# Patient Record
Sex: MALE | Race: WHITE | NOT HISPANIC OR LATINO
[De-identification: names, ages, dates, MRNs, and addresses within clinical notes are randomized per-mention and may not be internally consistent; named-entity substitution may affect disease eponyms.]

---

## 2017-01-04 ENCOUNTER — APPOINTMENT (OUTPATIENT)
Dept: INTERNAL MEDICINE | Facility: CLINIC | Age: 47
End: 2017-01-04

## 2017-01-04 VITALS
HEART RATE: 86 BPM | RESPIRATION RATE: 12 BRPM | DIASTOLIC BLOOD PRESSURE: 60 MMHG | SYSTOLIC BLOOD PRESSURE: 94 MMHG | OXYGEN SATURATION: 99 % | BODY MASS INDEX: 20.32 KG/M2 | HEIGHT: 72 IN | WEIGHT: 150 LBS

## 2017-01-09 LAB
ALBUMIN SERPL ELPH-MCNC: 4.5 G/DL
ALP BLD-CCNC: 85 U/L
ALT SERPL-CCNC: 39 U/L
ANION GAP SERPL CALC-SCNC: 16 MMOL/L
AST SERPL-CCNC: 37 U/L
BASOPHILS # BLD AUTO: 0.04 K/UL
BASOPHILS NFR BLD AUTO: 0.5 %
BILIRUB SERPL-MCNC: 0.6 MG/DL
BUN SERPL-MCNC: 17 MG/DL
CALCIUM SERPL-MCNC: 10.2 MG/DL
CHLORIDE SERPL-SCNC: 104 MMOL/L
CHOLEST SERPL-MCNC: 159 MG/DL
CHOLEST/HDLC SERPL: 2.6 RATIO
CO2 SERPL-SCNC: 23 MMOL/L
CREAT SERPL-MCNC: 0.72 MG/DL
EOSINOPHIL # BLD AUTO: 0.14 K/UL
EOSINOPHIL NFR BLD AUTO: 1.7 %
GLUCOSE SERPL-MCNC: 89 MG/DL
HCT VFR BLD CALC: 45.7 %
HDLC SERPL-MCNC: 62 MG/DL
HGB BLD-MCNC: 15.6 G/DL
IMM GRANULOCYTES NFR BLD AUTO: 0.4 %
LDLC SERPL CALC-MCNC: 77 MG/DL
LYMPHOCYTES # BLD AUTO: 1.96 K/UL
LYMPHOCYTES NFR BLD AUTO: 23.8 %
MAN DIFF?: NORMAL
MCHC RBC-ENTMCNC: 32.2 PG
MCHC RBC-ENTMCNC: 34.1 GM/DL
MCV RBC AUTO: 94.4 FL
MONOCYTES # BLD AUTO: 0.61 K/UL
MONOCYTES NFR BLD AUTO: 7.4 %
NEUTROPHILS # BLD AUTO: 5.45 K/UL
NEUTROPHILS NFR BLD AUTO: 66.2 %
PLATELET # BLD AUTO: 281 K/UL
POTASSIUM SERPL-SCNC: 4.8 MMOL/L
PROT SERPL-MCNC: 6.9 G/DL
RBC # BLD: 4.84 M/UL
RBC # FLD: 12.9 %
SODIUM SERPL-SCNC: 143 MMOL/L
TRIGL SERPL-MCNC: 99 MG/DL
WBC # FLD AUTO: 8.23 K/UL

## 2017-09-27 ENCOUNTER — APPOINTMENT (OUTPATIENT)
Dept: INTERNAL MEDICINE | Facility: CLINIC | Age: 47
End: 2017-09-27
Payer: COMMERCIAL

## 2017-09-27 VITALS
RESPIRATION RATE: 12 BRPM | BODY MASS INDEX: 19.77 KG/M2 | HEART RATE: 63 BPM | OXYGEN SATURATION: 99 % | DIASTOLIC BLOOD PRESSURE: 59 MMHG | SYSTOLIC BLOOD PRESSURE: 92 MMHG | WEIGHT: 146 LBS | HEIGHT: 72 IN

## 2017-09-27 DIAGNOSIS — R19.5 OTHER FECAL ABNORMALITIES: ICD-10-CM

## 2017-09-27 DIAGNOSIS — M54.5 LOW BACK PAIN: ICD-10-CM

## 2017-09-27 LAB
CARD LOT #: 3341
CARD LOT EXP DATE: NORMAL
DATE COLLECTED: NORMAL
DEVELOPER LOT #: NORMAL
DEVELOPER LOT EXP DATE: NORMAL
HEMOCCULT SP1 STL QL: NEGATIVE
QUALITY CONTROL: NO

## 2017-09-27 PROCEDURE — 82270 OCCULT BLOOD FECES: CPT

## 2017-09-27 PROCEDURE — 99396 PREV VISIT EST AGE 40-64: CPT | Mod: 25

## 2017-10-07 PROBLEM — Z00.00 ENCOUNTER FOR PREVENTIVE HEALTH EXAMINATION: Noted: 2017-10-07

## 2017-10-12 ENCOUNTER — OUTPATIENT (OUTPATIENT)
Dept: OUTPATIENT SERVICES | Facility: HOSPITAL | Age: 47
LOS: 1 days | End: 2017-10-12
Payer: COMMERCIAL

## 2017-10-12 ENCOUNTER — APPOINTMENT (OUTPATIENT)
Dept: MRI IMAGING | Facility: CLINIC | Age: 47
End: 2017-10-12

## 2017-10-12 DIAGNOSIS — Z00.8 ENCOUNTER FOR OTHER GENERAL EXAMINATION: ICD-10-CM

## 2017-10-12 PROCEDURE — 72148 MRI LUMBAR SPINE W/O DYE: CPT

## 2017-10-12 PROCEDURE — 72148 MRI LUMBAR SPINE W/O DYE: CPT | Mod: 26

## 2018-09-28 ENCOUNTER — APPOINTMENT (OUTPATIENT)
Dept: INTERNAL MEDICINE | Facility: CLINIC | Age: 48
End: 2018-09-28
Payer: COMMERCIAL

## 2018-09-28 VITALS
RESPIRATION RATE: 12 BRPM | OXYGEN SATURATION: 97 % | WEIGHT: 148.9 LBS | TEMPERATURE: 97.5 F | BODY MASS INDEX: 20.17 KG/M2 | HEIGHT: 72 IN | HEART RATE: 65 BPM | SYSTOLIC BLOOD PRESSURE: 102 MMHG | DIASTOLIC BLOOD PRESSURE: 69 MMHG

## 2018-09-28 PROCEDURE — 82270 OCCULT BLOOD FECES: CPT

## 2018-09-28 PROCEDURE — 99396 PREV VISIT EST AGE 40-64: CPT | Mod: 25

## 2018-09-28 RX ORDER — CYCLOBENZAPRINE HYDROCHLORIDE 10 MG/1
10 TABLET, FILM COATED ORAL 3 TIMES DAILY
Qty: 30 | Refills: 3 | Status: DISCONTINUED | COMMUNITY
Start: 2017-01-04 | End: 2018-09-28

## 2018-09-28 RX ORDER — MELOXICAM 7.5 MG/1
7.5 TABLET ORAL
Qty: 60 | Refills: 5 | Status: DISCONTINUED | COMMUNITY
Start: 2017-01-04 | End: 2018-09-28

## 2018-09-28 NOTE — HEALTH RISK ASSESSMENT
[Excellent] : ~his/her~ current health as excellent [Good] : ~his/her~  mood as  good [No falls in past year] : Patient reported no falls in the past year [0] : 2) Feeling down, depressed, or hopeless: Not at all (0) [FreeTextEntry1] : atypical moles on back. [] : No [de-identified] : socially [Reports changes in dental health] : Reports no changes in dental health [ColonoscopyComments] : Needs appointment [de-identified] : high pitch limited, left ear. [de-identified] : reading glasses

## 2018-09-28 NOTE — PHYSICAL EXAM

## 2018-09-28 NOTE — PLAN
[FreeTextEntry1] : Add carbs, as he is on a paleo diet.\par check lipid profile, psa, cmp.\par declined influenza vaccation.\par Referral for screening colonoscopy.

## 2018-10-03 LAB
ALBUMIN SERPL ELPH-MCNC: 4.6 G/DL
ALP BLD-CCNC: 93 U/L
ALT SERPL-CCNC: 25 U/L
ANION GAP SERPL CALC-SCNC: 20 MMOL/L
AST SERPL-CCNC: 31 U/L
BILIRUB SERPL-MCNC: 1.3 MG/DL
BUN SERPL-MCNC: 15 MG/DL
CALCIUM SERPL-MCNC: 10.1 MG/DL
CHLORIDE SERPL-SCNC: 98 MMOL/L
CHOLEST SERPL-MCNC: 132 MG/DL
CHOLEST/HDLC SERPL: 2.1 RATIO
CO2 SERPL-SCNC: 21 MMOL/L
CREAT SERPL-MCNC: 0.68 MG/DL
GLUCOSE SERPL-MCNC: 69 MG/DL
HDLC SERPL-MCNC: 63 MG/DL
LDLC SERPL CALC-MCNC: 61 MG/DL
POTASSIUM SERPL-SCNC: 4.2 MMOL/L
PROT SERPL-MCNC: 6.9 G/DL
PSA FREE FLD-MCNC: 52.1
PSA FREE SERPL-MCNC: 0.25 NG/ML
PSA SERPL-MCNC: 0.48 NG/ML
SODIUM SERPL-SCNC: 139 MMOL/L
TRIGL SERPL-MCNC: 42 MG/DL

## 2018-11-27 ENCOUNTER — TRANSCRIPTION ENCOUNTER (OUTPATIENT)
Age: 48
End: 2018-11-27

## 2018-11-28 ENCOUNTER — OUTPATIENT (OUTPATIENT)
Dept: OUTPATIENT SERVICES | Facility: HOSPITAL | Age: 48
LOS: 1 days | Discharge: HOME | End: 2018-11-28
Payer: COMMERCIAL

## 2018-11-28 DIAGNOSIS — Z12.11 ENCOUNTER FOR SCREENING FOR MALIGNANT NEOPLASM OF COLON: ICD-10-CM

## 2018-11-28 PROCEDURE — G0121: CPT

## 2018-11-28 RX ORDER — SODIUM CHLORIDE 9 MG/ML
1000 INJECTION INTRAMUSCULAR; INTRAVENOUS; SUBCUTANEOUS
Qty: 0 | Refills: 0 | Status: DISCONTINUED | OUTPATIENT
Start: 2018-11-28 | End: 2018-12-13

## 2018-11-28 RX ADMIN — SODIUM CHLORIDE 75 MILLILITER(S): 9 INJECTION INTRAMUSCULAR; INTRAVENOUS; SUBCUTANEOUS at 09:05

## 2018-12-05 ENCOUNTER — APPOINTMENT (OUTPATIENT)
Dept: GASTROENTEROLOGY | Facility: CLINIC | Age: 48
End: 2018-12-05

## 2019-06-26 ENCOUNTER — APPOINTMENT (OUTPATIENT)
Dept: INTERNAL MEDICINE | Facility: CLINIC | Age: 49
End: 2019-06-26
Payer: COMMERCIAL

## 2019-06-26 VITALS
HEART RATE: 76 BPM | HEIGHT: 72 IN | TEMPERATURE: 97.8 F | SYSTOLIC BLOOD PRESSURE: 111 MMHG | WEIGHT: 146 LBS | DIASTOLIC BLOOD PRESSURE: 72 MMHG | BODY MASS INDEX: 19.77 KG/M2 | RESPIRATION RATE: 12 BRPM

## 2019-06-26 PROCEDURE — 99213 OFFICE O/P EST LOW 20 MIN: CPT

## 2019-06-26 NOTE — PHYSICAL EXAM
[Well Developed] : well developed [Well Nourished] : well nourished [Normal Rhythm/Effort] : normal respiratory rhythm and effort [Clear Bilaterally] : the lungs were clear to auscultation bilaterally [Normal to Percussion] : the lungs were normal to percussion [Normal Rate] : normal [Normal S1] : normal S1 [Normal S2] : normal S2 [S3] : no S3 [S4] : no S4 [No Murmur] : no murmurs heard [No Pitting Edema] : no pitting edema present [Right Carotid Bruit] : no bruit heard over the right carotid [Left Carotid Bruit] : no bruit heard over the left carotid [Right Femoral Bruit] : no bruit heard over the right femoral artery [Left Femoral Bruit] : no bruit heard over the left femoral artery [2+] : left 2+ [No Abnormalities] : the abdominal aorta was not enlarged and no bruit was heard [Soft, Nontender] : the abdomen was soft and nontender [No Mass] : no masses were palpated [No HSM] : no hepatosplenomegaly noted [Normal Station and Gait] : the gait and station were normal [Normal] : motor strength was normal in all muscle groups [Normal Motor Tone] : the muscle tone was normal [Involuntary Movements] : no involuntary movements were seen

## 2019-06-26 NOTE — HISTORY OF PRESENT ILLNESS
[FreeTextEntry8] : Pt. presents after being bitten by a tic 14 days ago. He has a papular rash on the left groin area. No joint pain, target lesion, bells palsy.\par Otherwise feels okay.

## 2019-06-26 NOTE — PLAN
[FreeTextEntry1] : Reassurance given.\par Discussed symptoms of acute and chronic lyme disease.\par script for blood work given. CPE scheduled in 3 months.

## 2019-07-03 ENCOUNTER — APPOINTMENT (OUTPATIENT)
Dept: FAMILY MEDICINE | Facility: CLINIC | Age: 49
End: 2019-07-03
Payer: COMMERCIAL

## 2019-07-03 VITALS
HEIGHT: 72 IN | DIASTOLIC BLOOD PRESSURE: 70 MMHG | TEMPERATURE: 98.4 F | OXYGEN SATURATION: 99 % | WEIGHT: 148 LBS | BODY MASS INDEX: 20.05 KG/M2 | HEART RATE: 90 BPM | SYSTOLIC BLOOD PRESSURE: 100 MMHG | RESPIRATION RATE: 12 BRPM

## 2019-07-03 PROCEDURE — 99214 OFFICE O/P EST MOD 30 MIN: CPT

## 2019-07-04 NOTE — ASSESSMENT
[FreeTextEntry1] : Pt counseling antibiotics done\par had several questions that were answered to his apparent understanding\par declined blood work rather send tick analysis\par PCP f/u advised

## 2019-07-04 NOTE — HISTORY OF PRESENT ILLNESS
[FreeTextEntry8] : 50 y/o here acute visit now has round rash around left inguinal area. Works in the woods outside. , Notes a big red erythema rash around the bite side. Not itchy warm to touch    Pt denies fevers joint pain facial weakness states has lazy eye, denies additional symptoms.  Pt denies medication allergies and states does not like taking antibiotic. States was found tick on him and could have been there over 36 hrs  it happened around June 14th.  Saw PMD had small rash at site but not the same as now.  Pt still has tic at home and rather test the tick and no do any blood work himself.

## 2019-07-04 NOTE — PHYSICAL EXAM
[No Acute Distress] : no acute distress [Normal Sclera/Conjunctiva] : normal sclera/conjunctiva [Well Developed] : well developed [PERRL] : pupils equal round and reactive to light [EOMI] : extraocular movements intact [No Accessory Muscle Use] : no accessory muscle use [Regular Rhythm] : with a regular rhythm [Clear to Auscultation] : lungs were clear to auscultation bilaterally [Normal S1, S2] : normal S1 and S2 [de-identified] : no facial droop  [de-identified] : round ring like rash on left lower abdominal area

## 2019-07-05 ENCOUNTER — LABORATORY RESULT (OUTPATIENT)
Age: 49
End: 2019-07-05

## 2019-07-08 ENCOUNTER — APPOINTMENT (OUTPATIENT)
Dept: INTERNAL MEDICINE | Facility: CLINIC | Age: 49
End: 2019-07-08

## 2019-07-29 ENCOUNTER — APPOINTMENT (OUTPATIENT)
Dept: INTERNAL MEDICINE | Facility: CLINIC | Age: 49
End: 2019-07-29
Payer: COMMERCIAL

## 2019-07-29 VITALS
WEIGHT: 147 LBS | HEIGHT: 72 IN | OXYGEN SATURATION: 99 % | SYSTOLIC BLOOD PRESSURE: 86 MMHG | RESPIRATION RATE: 12 BRPM | HEART RATE: 68 BPM | BODY MASS INDEX: 19.91 KG/M2 | TEMPERATURE: 97.6 F | DIASTOLIC BLOOD PRESSURE: 52 MMHG

## 2019-07-29 DIAGNOSIS — A69.20 LYME DISEASE, UNSPECIFIED: ICD-10-CM

## 2019-07-29 PROCEDURE — 99213 OFFICE O/P EST LOW 20 MIN: CPT

## 2019-07-29 NOTE — PHYSICAL EXAM
[No Acute Distress] : no acute distress [Well Nourished] : well nourished [Well Developed] : well developed [Well-Appearing] : well-appearing [Normal Sclera/Conjunctiva] : normal sclera/conjunctiva [PERRL] : pupils equal round and reactive to light [EOMI] : extraocular movements intact [Normal Outer Ear/Nose] : the outer ears and nose were normal in appearance [Normal Oropharynx] : the oropharynx was normal [No JVD] : no jugular venous distention [No Lymphadenopathy] : no lymphadenopathy [Supple] : supple [Thyroid Normal, No Nodules] : the thyroid was normal and there were no nodules present [No Respiratory Distress] : no respiratory distress  [No Accessory Muscle Use] : no accessory muscle use [Clear to Auscultation] : lungs were clear to auscultation bilaterally [Normal Rate] : normal rate  [Regular Rhythm] : with a regular rhythm [Normal S1, S2] : normal S1 and S2 [No Murmur] : no murmur heard [No Carotid Bruits] : no carotid bruits [No Abdominal Bruit] : a ~M bruit was not heard ~T in the abdomen [No Varicosities] : no varicosities [Pedal Pulses Present] : the pedal pulses are present [No Edema] : there was no peripheral edema [No Palpable Aorta] : no palpable aorta [No Extremity Clubbing/Cyanosis] : no extremity clubbing/cyanosis [Soft] : abdomen soft [Non Tender] : non-tender [Non-distended] : non-distended [No HSM] : no HSM [No Masses] : no abdominal mass palpated [Normal Bowel Sounds] : normal bowel sounds [Normal Posterior Cervical Nodes] : no posterior cervical lymphadenopathy [Normal Anterior Cervical Nodes] : no anterior cervical lymphadenopathy [No CVA Tenderness] : no CVA  tenderness [No Joint Swelling] : no joint swelling [No Spinal Tenderness] : no spinal tenderness [Grossly Normal Strength/Tone] : grossly normal strength/tone [No Rash] : no rash [Coordination Grossly Intact] : coordination grossly intact [No Focal Deficits] : no focal deficits [Normal Gait] : normal gait [Deep Tendon Reflexes (DTR)] : deep tendon reflexes were 2+ and symmetric [Normal Affect] : the affect was normal [Normal Insight/Judgement] : insight and judgment were intact

## 2019-07-29 NOTE — HISTORY OF PRESENT ILLNESS
[de-identified] : Pt. presents for f/u after having ? lyme disease. He works outside, had a questionable tic bite, and developed a bullseye rash. He was prescribed doxycycline for 21 days with resolution of his symptoms. He never developed arthritis, bells palsy or other sequela from lyme disease.

## 2019-07-29 NOTE — PLAN
[FreeTextEntry1] : Reassurance given. \par No further treatment is indicated.\par If symptoms recur will refer pt. for infectious disease consultation.

## 2019-09-30 ENCOUNTER — APPOINTMENT (OUTPATIENT)
Dept: INTERNAL MEDICINE | Facility: CLINIC | Age: 49
End: 2019-09-30

## 2019-10-04 LAB
ALBUMIN SERPL ELPH-MCNC: 4.7 G/DL
ALP BLD-CCNC: 81 U/L
ALT SERPL-CCNC: 27 U/L
ANION GAP SERPL CALC-SCNC: 12 MMOL/L
AST SERPL-CCNC: 47 U/L
BASOPHILS # BLD AUTO: 0.06 K/UL
BASOPHILS NFR BLD AUTO: 1.1 %
BILIRUB SERPL-MCNC: 1 MG/DL
BUN SERPL-MCNC: 12 MG/DL
CALCIUM SERPL-MCNC: 10.2 MG/DL
CHLORIDE SERPL-SCNC: 102 MMOL/L
CHOLEST SERPL-MCNC: 136 MG/DL
CHOLEST/HDLC SERPL: 2.2 RATIO
CO2 SERPL-SCNC: 25 MMOL/L
CREAT SERPL-MCNC: 0.57 MG/DL
EOSINOPHIL # BLD AUTO: 0.07 K/UL
EOSINOPHIL NFR BLD AUTO: 1.2 %
GLUCOSE SERPL-MCNC: 88 MG/DL
HCT VFR BLD CALC: 44.9 %
HDLC SERPL-MCNC: 61 MG/DL
HGB BLD-MCNC: 15 G/DL
IMM GRANULOCYTES NFR BLD AUTO: 0.4 %
LDLC SERPL CALC-MCNC: 68 MG/DL
LYMPHOCYTES # BLD AUTO: 1.09 K/UL
LYMPHOCYTES NFR BLD AUTO: 19.3 %
MAN DIFF?: NORMAL
MCHC RBC-ENTMCNC: 32.3 PG
MCHC RBC-ENTMCNC: 33.4 GM/DL
MCV RBC AUTO: 96.8 FL
MONOCYTES # BLD AUTO: 0.45 K/UL
MONOCYTES NFR BLD AUTO: 8 %
NEUTROPHILS # BLD AUTO: 3.97 K/UL
NEUTROPHILS NFR BLD AUTO: 70 %
PLATELET # BLD AUTO: 317 K/UL
POTASSIUM SERPL-SCNC: 4.6 MMOL/L
PROT SERPL-MCNC: 6.4 G/DL
PSA FREE FLD-MCNC: 50 %
PSA FREE SERPL-MCNC: 0.2 NG/ML
PSA SERPL-MCNC: 0.4 NG/ML
RBC # BLD: 4.64 M/UL
RBC # FLD: 12.5 %
SODIUM SERPL-SCNC: 139 MMOL/L
TRIGL SERPL-MCNC: 36 MG/DL
WBC # FLD AUTO: 5.66 K/UL

## 2019-10-11 ENCOUNTER — NON-APPOINTMENT (OUTPATIENT)
Age: 49
End: 2019-10-11

## 2019-10-11 ENCOUNTER — APPOINTMENT (OUTPATIENT)
Dept: INTERNAL MEDICINE | Facility: CLINIC | Age: 49
End: 2019-10-11
Payer: COMMERCIAL

## 2019-10-11 VITALS
BODY MASS INDEX: 20.18 KG/M2 | HEART RATE: 80 BPM | OXYGEN SATURATION: 99 % | SYSTOLIC BLOOD PRESSURE: 100 MMHG | WEIGHT: 149 LBS | DIASTOLIC BLOOD PRESSURE: 68 MMHG | HEIGHT: 72 IN | RESPIRATION RATE: 12 BRPM

## 2019-10-11 DIAGNOSIS — M25.552 PAIN IN LEFT HIP: ICD-10-CM
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
## 2019-10-11 PROCEDURE — 93000 ELECTROCARDIOGRAM COMPLETE: CPT

## 2019-10-11 PROCEDURE — 90686 IIV4 VACC NO PRSV 0.5 ML IM: CPT

## 2019-10-11 PROCEDURE — G0008: CPT

## 2019-10-11 PROCEDURE — 99396 PREV VISIT EST AGE 40-64: CPT | Mod: 25

## 2019-10-11 RX ORDER — DOXYCYCLINE HYCLATE 100 MG/1
100 CAPSULE ORAL
Qty: 42 | Refills: 0 | Status: DISCONTINUED | COMMUNITY
Start: 2019-07-03 | End: 2019-10-11

## 2019-10-11 NOTE — HISTORY OF PRESENT ILLNESS
[de-identified] : Intermittent left hip pain.\par Completed 21 course of antibiotics for presumed lyme disease.
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
## 2019-10-17 ENCOUNTER — MED ADMIN CHARGE (OUTPATIENT)
Age: 49
End: 2019-10-17